# Patient Record
Sex: MALE | Race: WHITE | NOT HISPANIC OR LATINO | Employment: FULL TIME | URBAN - METROPOLITAN AREA
[De-identification: names, ages, dates, MRNs, and addresses within clinical notes are randomized per-mention and may not be internally consistent; named-entity substitution may affect disease eponyms.]

---

## 2023-02-18 ENCOUNTER — HOSPITAL ENCOUNTER (EMERGENCY)
Facility: HOSPITAL | Age: 38
Discharge: HOME | End: 2023-02-18
Payer: COMMERCIAL

## 2023-02-18 VITALS
WEIGHT: 200 LBS | HEIGHT: 69 IN | OXYGEN SATURATION: 99 % | TEMPERATURE: 98 F | DIASTOLIC BLOOD PRESSURE: 75 MMHG | RESPIRATION RATE: 20 BRPM | HEART RATE: 100 BPM | BODY MASS INDEX: 29.62 KG/M2 | SYSTOLIC BLOOD PRESSURE: 100 MMHG

## 2023-02-18 DIAGNOSIS — M54.9 BACK PAIN, UNSPECIFIED BACK LOCATION, UNSPECIFIED BACK PAIN LATERALITY, UNSPECIFIED CHRONICITY: ICD-10-CM
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
## 2023-02-18 DIAGNOSIS — T07.XXXA ABRASIONS OF MULTIPLE SITES: ICD-10-CM

## 2023-02-18 DIAGNOSIS — R07.81 RIB PAIN: ICD-10-CM

## 2023-02-18 PROCEDURE — 72125 CT NECK SPINE W/O DYE: CPT | Mod: TC

## 2023-02-18 PROCEDURE — 72128 CT THORACIC SPINE WITHOUT CONTRAST: ICD-10-PCS | Mod: 26,,, | Performed by: RADIOLOGY

## 2023-02-18 PROCEDURE — 72192 CT SACRUM WITHOUT CONTRAST: ICD-10-PCS | Mod: 26,,, | Performed by: RADIOLOGY

## 2023-02-18 PROCEDURE — 72128 CT CHEST SPINE W/O DYE: CPT | Mod: TC

## 2023-02-18 PROCEDURE — 72192 CT PELVIS W/O DYE: CPT | Mod: TC

## 2023-02-18 PROCEDURE — 72192 CT PELVIS W/O DYE: CPT | Mod: 26,,, | Performed by: RADIOLOGY

## 2023-02-18 PROCEDURE — 99284 EMERGENCY DEPT VISIT MOD MDM: CPT | Mod: 25

## 2023-02-18 PROCEDURE — 86803 HEPATITIS C AB TEST: CPT | Performed by: EMERGENCY MEDICINE

## 2023-02-18 PROCEDURE — 72131 CT LUMBAR SPINE W/O DYE: CPT | Mod: 26,,, | Performed by: RADIOLOGY

## 2023-02-18 PROCEDURE — 72131 CT LUMBAR SPINE W/O DYE: CPT | Mod: TC

## 2023-02-18 PROCEDURE — 72131 CT LUMBAR SPINE WITHOUT CONTRAST: ICD-10-PCS | Mod: 26,,, | Performed by: RADIOLOGY

## 2023-02-18 PROCEDURE — 72128 CT CHEST SPINE W/O DYE: CPT | Mod: 26,,, | Performed by: RADIOLOGY

## 2023-02-18 PROCEDURE — 36415 COLL VENOUS BLD VENIPUNCTURE: CPT | Performed by: EMERGENCY MEDICINE

## 2023-02-18 PROCEDURE — 72125 CT NECK SPINE W/O DYE: CPT | Mod: 26,,, | Performed by: RADIOLOGY

## 2023-02-18 PROCEDURE — 87389 HIV-1 AG W/HIV-1&-2 AB AG IA: CPT | Performed by: EMERGENCY MEDICINE

## 2023-02-18 PROCEDURE — 72125 CT CERVICAL SPINE WITHOUT CONTRAST: ICD-10-PCS | Mod: 26,,, | Performed by: RADIOLOGY

## 2023-02-18 NOTE — ED PROVIDER NOTES
Encounter Date: 2/18/2023       History     Chief Complaint   Patient presents with    Fall     Fell through attic approx 1 hour PTA. Denies LOC.    Back Pain     Low back pain    Rib Injury     L side rib pain    Abrasion     R side forehead     Patient is a 37-year-old male presents emergency room with lower back pain, left rib pain, multiple abrasions and scratches.  Patient was in an attic when he missed a step and fell through sheet rock falling approximately 8 ft.  MCFP down patient states he hit his left side on some stairway railing, and then to the floor.  Patient denies any loss of consciousness.  Patient does have abrasions to right temporal/cheek area, abrasion to the lumbar area lower back, and multiple scrapes to both legs, right worse than the left.  Patient was ambulatory prior to arrival.  Patient states it happened approximately 1 hour prior to arrival.  Patient denies taking any medications on daily basis, or prior to coming to the emergency room.  He denies any significant chest pain, shortness of breath, nausea, vomiting, diarrhea, dizziness, numbness, tingling, vision changes, abdominal pain.    Review of patient's allergies indicates:  No Known Allergies  History reviewed. No pertinent past medical history.  Past Surgical History:   Procedure Laterality Date    HERNIA REPAIR      TONSILLECTOMY       Family History   Problem Relation Age of Onset    Hyperlipidemia Father      Social History     Tobacco Use    Smoking status: Never    Smokeless tobacco: Never   Substance Use Topics    Alcohol use: Yes     Comment: 4-5 servings/week    Drug use: No     Review of Systems   Constitutional: Negative.    HENT: Negative.     Eyes: Negative.    Respiratory: Negative.     Cardiovascular: Negative.    Gastrointestinal: Negative.    Endocrine: Negative.    Genitourinary: Negative.    Musculoskeletal:         Mid to lower back pain   Skin:         Abrasions to right side of face, lower back, and  scratches to the legs.   Allergic/Immunologic: Negative for food allergies.   Neurological: Negative.    Hematological: Negative.    Psychiatric/Behavioral: Negative.     All other systems reviewed and are negative.    Physical Exam     Initial Vitals   BP Pulse Resp Temp SpO2   02/18/23 1153 02/18/23 1151 02/18/23 1151 02/18/23 1151 02/18/23 1151   100/75 100 20 97.6 °F (36.4 °C) 99 %      MAP       --                Physical Exam    Nursing note and vitals reviewed.  Constitutional: He appears well-developed and well-nourished. He is not diaphoretic. No distress.   HENT:   Head: Normocephalic.   Mouth/Throat: Oropharynx is clear and moist.   Eyes: Conjunctivae and EOM are normal. Pupils are equal, round, and reactive to light. No scleral icterus.   Neck: Neck supple. No tracheal deviation present.   Normal range of motion.  Cardiovascular:  Normal rate, regular rhythm, normal heart sounds and intact distal pulses.     Exam reveals no friction rub.       No murmur heard.  Pulmonary/Chest: Breath sounds normal. No respiratory distress. He has no wheezes. He has no rhonchi. He has no rales. He exhibits no tenderness.   Abdominal: Abdomen is soft. Bowel sounds are normal. He exhibits no distension.   Musculoskeletal:         General: No tenderness or edema. Normal range of motion.      Cervical back: Normal range of motion and neck supple.     Lymphadenopathy:     He has no cervical adenopathy.   Neurological: He is alert and oriented to person, place, and time. He has normal strength. He displays normal reflexes. No cranial nerve deficit or sensory deficit. GCS score is 15. GCS eye subscore is 4. GCS verbal subscore is 5. GCS motor subscore is 6.   Skin: Skin is warm and dry. Capillary refill takes 2 to 3 seconds.   Patient has small abrasions to the right temporal/forehead area/cheek.  Patient also has abrasions to the lumbar area, multiple scratches/abrasions to the right leg and few scratches to the left leg.    Psychiatric: He has a normal mood and affect.       ED Course   Procedures  Labs Reviewed   HIV 1 / 2 ANTIBODY   HEPATITIS C ANTIBODY          Imaging Results              CT Sacrum Without Contrast (Final result)  Result time 02/18/23 13:21:59      Final result by Ally Rivera MD (02/18/23 13:21:59)                   Impression:      No fracture or traumatic malalignment of the spine or sacrum.    Contusion/hematoma in the lower posterior subcutaneous fat.      Electronically signed by: Ally Rivera  Date:    02/18/2023  Time:    13:21               Narrative:    EXAMINATION:  CT CERVICAL SPINE WITHOUT CONTRAST; CT SACRUM WITHOUT CONTRAST; CT LUMBAR SPINE WITHOUT CONTRAST; CT THORACIC SPINE WITHOUT CONTRAST    CLINICAL HISTORY:  fall 8 ft with back pain;; Low back pain, increased fracture risk;; Mid-back pain, compression fracture suspected;    TECHNIQUE:  Low dose axial CT images through the cervical, thoracic, lumbar spine and sacrum with sagittal and coronal reformations.  Contrast was not administered.    COMPARISON:  None    FINDINGS:  Cervical spine:    Imaged brain is unremarkable.  The craniocervical junction is maintained.    The vertebral bodies are normal in height and morphology without evidence of fracture or osseous destructive process.  Normal sagittal alignment is preserved.    No significant spinal canal or neural foraminal stenosis.  Intervertebral disc heights are well maintained.    Limited evaluation of the intraspinal contents demonstrates no hematoma or mass.Paraspinal soft tissues exhibit no acute abnormalities.    Thoracic spine:    Motion to the midthoracic level obscures evaluation.  No definite significant vertebral body height loss or acute, displaced fracture.  Normal sagittal alignment is preserved.    New significant spinal canal or neural foraminal stenosis.  Intervertebral disc space heights are well maintained.    Limited evaluation of the intraspinal contents demonstrates no  hematoma or mass.Paraspinal soft tissues exhibit no acute abnormalities.    Lumbar spine:    The vertebral bodies are normal in height and morphology without evidence of fracture or osseous destructive process.  Normal sagittal alignment is preserved.    New significant spinal canal or neural foraminal stenosis. Intervertebral disc space heights are well maintained.    Limited evaluation of the intraspinal contents demonstrates no hematoma or mass    Intermediate attenuation fluid within the dependent posterior subcutaneous fat with adjacent stranding    Sacrum:    No acute abnormality within the pelvis.  Sacroiliac joints are symmetric.  The femoral heads are well seated within the acetabula.  Bone island within the right femoral neck.  No acute, displaced fracture or aggressive osseous abnormality.                                       CT Lumbar Spine Without Contrast (Final result)  Result time 02/18/23 13:21:59      Final result by Ally Rivera MD (02/18/23 13:21:59)                   Impression:      No fracture or traumatic malalignment of the spine or sacrum.    Contusion/hematoma in the lower posterior subcutaneous fat.      Electronically signed by: Ally Rivera  Date:    02/18/2023  Time:    13:21               Narrative:    EXAMINATION:  CT CERVICAL SPINE WITHOUT CONTRAST; CT SACRUM WITHOUT CONTRAST; CT LUMBAR SPINE WITHOUT CONTRAST; CT THORACIC SPINE WITHOUT CONTRAST    CLINICAL HISTORY:  fall 8 ft with back pain;; Low back pain, increased fracture risk;; Mid-back pain, compression fracture suspected;    TECHNIQUE:  Low dose axial CT images through the cervical, thoracic, lumbar spine and sacrum with sagittal and coronal reformations.  Contrast was not administered.    COMPARISON:  None    FINDINGS:  Cervical spine:    Imaged brain is unremarkable.  The craniocervical junction is maintained.    The vertebral bodies are normal in height and morphology without evidence of fracture or osseous destructive  process.  Normal sagittal alignment is preserved.    No significant spinal canal or neural foraminal stenosis.  Intervertebral disc heights are well maintained.    Limited evaluation of the intraspinal contents demonstrates no hematoma or mass.Paraspinal soft tissues exhibit no acute abnormalities.    Thoracic spine:    Motion to the midthoracic level obscures evaluation.  No definite significant vertebral body height loss or acute, displaced fracture.  Normal sagittal alignment is preserved.    New significant spinal canal or neural foraminal stenosis.  Intervertebral disc space heights are well maintained.    Limited evaluation of the intraspinal contents demonstrates no hematoma or mass.Paraspinal soft tissues exhibit no acute abnormalities.    Lumbar spine:    The vertebral bodies are normal in height and morphology without evidence of fracture or osseous destructive process.  Normal sagittal alignment is preserved.    New significant spinal canal or neural foraminal stenosis. Intervertebral disc space heights are well maintained.    Limited evaluation of the intraspinal contents demonstrates no hematoma or mass    Intermediate attenuation fluid within the dependent posterior subcutaneous fat with adjacent stranding    Sacrum:    No acute abnormality within the pelvis.  Sacroiliac joints are symmetric.  The femoral heads are well seated within the acetabula.  Bone island within the right femoral neck.  No acute, displaced fracture or aggressive osseous abnormality.                                       CT Thoracic Spine Without Contrast (Final result)  Result time 02/18/23 13:21:59      Final result by Ally Rivera MD (02/18/23 13:21:59)                   Impression:      No fracture or traumatic malalignment of the spine or sacrum.    Contusion/hematoma in the lower posterior subcutaneous fat.      Electronically signed by: Ally Rivera  Date:    02/18/2023  Time:    13:21               Narrative:     EXAMINATION:  CT CERVICAL SPINE WITHOUT CONTRAST; CT SACRUM WITHOUT CONTRAST; CT LUMBAR SPINE WITHOUT CONTRAST; CT THORACIC SPINE WITHOUT CONTRAST    CLINICAL HISTORY:  fall 8 ft with back pain;; Low back pain, increased fracture risk;; Mid-back pain, compression fracture suspected;    TECHNIQUE:  Low dose axial CT images through the cervical, thoracic, lumbar spine and sacrum with sagittal and coronal reformations.  Contrast was not administered.    COMPARISON:  None    FINDINGS:  Cervical spine:    Imaged brain is unremarkable.  The craniocervical junction is maintained.    The vertebral bodies are normal in height and morphology without evidence of fracture or osseous destructive process.  Normal sagittal alignment is preserved.    No significant spinal canal or neural foraminal stenosis.  Intervertebral disc heights are well maintained.    Limited evaluation of the intraspinal contents demonstrates no hematoma or mass.Paraspinal soft tissues exhibit no acute abnormalities.    Thoracic spine:    Motion to the midthoracic level obscures evaluation.  No definite significant vertebral body height loss or acute, displaced fracture.  Normal sagittal alignment is preserved.    New significant spinal canal or neural foraminal stenosis.  Intervertebral disc space heights are well maintained.    Limited evaluation of the intraspinal contents demonstrates no hematoma or mass.Paraspinal soft tissues exhibit no acute abnormalities.    Lumbar spine:    The vertebral bodies are normal in height and morphology without evidence of fracture or osseous destructive process.  Normal sagittal alignment is preserved.    New significant spinal canal or neural foraminal stenosis. Intervertebral disc space heights are well maintained.    Limited evaluation of the intraspinal contents demonstrates no hematoma or mass    Intermediate attenuation fluid within the dependent posterior subcutaneous fat with adjacent stranding    Sacrum:    No  acute abnormality within the pelvis.  Sacroiliac joints are symmetric.  The femoral heads are well seated within the acetabula.  Bone island within the right femoral neck.  No acute, displaced fracture or aggressive osseous abnormality.                                       CT Cervical Spine Without Contrast (Final result)  Result time 02/18/23 13:21:59      Final result by Ally Rivera MD (02/18/23 13:21:59)                   Impression:      No fracture or traumatic malalignment of the spine or sacrum.    Contusion/hematoma in the lower posterior subcutaneous fat.      Electronically signed by: Ally Rivera  Date:    02/18/2023  Time:    13:21               Narrative:    EXAMINATION:  CT CERVICAL SPINE WITHOUT CONTRAST; CT SACRUM WITHOUT CONTRAST; CT LUMBAR SPINE WITHOUT CONTRAST; CT THORACIC SPINE WITHOUT CONTRAST    CLINICAL HISTORY:  fall 8 ft with back pain;; Low back pain, increased fracture risk;; Mid-back pain, compression fracture suspected;    TECHNIQUE:  Low dose axial CT images through the cervical, thoracic, lumbar spine and sacrum with sagittal and coronal reformations.  Contrast was not administered.    COMPARISON:  None    FINDINGS:  Cervical spine:    Imaged brain is unremarkable.  The craniocervical junction is maintained.    The vertebral bodies are normal in height and morphology without evidence of fracture or osseous destructive process.  Normal sagittal alignment is preserved.    No significant spinal canal or neural foraminal stenosis.  Intervertebral disc heights are well maintained.    Limited evaluation of the intraspinal contents demonstrates no hematoma or mass.Paraspinal soft tissues exhibit no acute abnormalities.    Thoracic spine:    Motion to the midthoracic level obscures evaluation.  No definite significant vertebral body height loss or acute, displaced fracture.  Normal sagittal alignment is preserved.    New significant spinal canal or neural foraminal stenosis.   Intervertebral disc space heights are well maintained.    Limited evaluation of the intraspinal contents demonstrates no hematoma or mass.Paraspinal soft tissues exhibit no acute abnormalities.    Lumbar spine:    The vertebral bodies are normal in height and morphology without evidence of fracture or osseous destructive process.  Normal sagittal alignment is preserved.    New significant spinal canal or neural foraminal stenosis. Intervertebral disc space heights are well maintained.    Limited evaluation of the intraspinal contents demonstrates no hematoma or mass    Intermediate attenuation fluid within the dependent posterior subcutaneous fat with adjacent stranding    Sacrum:    No acute abnormality within the pelvis.  Sacroiliac joints are symmetric.  The femoral heads are well seated within the acetabula.  Bone island within the right femoral neck.  No acute, displaced fracture or aggressive osseous abnormality.                                    X-Rays:   Independently Interpreted Readings:   Other Readings:  CT of C-spine, thoracic, lumbar, sacral     There is no acute fractures identified.  Radiology report reviewed, I agree.  See Radiology impressions below.      Impression:     No fracture or traumatic malalignment of the spine or sacrum.     Contusion/hematoma in the lower posterior subcutaneous fat.        Electronically signed by: Ally Rivera  Date:                                            02/18/2023  Medications - No data to display  Medical Decision Making:   Initial Assessment:   Patient seen examined emergency room, no acute distress at this time.  Detailed assessment as noted above.  Differential Diagnosis:   Fractures, dislocations, compression fractures, contusions, abrasions, lacerations, concussion  Clinical Tests:   Radiological Study: Ordered  ED Management:  After patient was seen examined, CT C-spine, thoracic, lumbar, sacrum was ordered.                        Clinical Impression:    Final diagnoses:  [W19.XXXA] Fall, initial encounter (Primary)  [T07.XXXA] Abrasions of multiple sites  [M54.9] Back pain, unspecified back location, unspecified back pain laterality, unspecified chronicity  [R07.81] Rib pain        ED Disposition Condition    Discharge Stable          ED Prescriptions    None       Follow-up Information    None          Piotr Knight NP  02/18/23 [id number]

## 2023-02-18 NOTE — DISCHARGE INSTRUCTIONS
Take Tylenol ibuprofen as needed for pain.    Follow-up primary care provider in 3-5 days if you experience significant pains.    You may have some muscle tenderness as well as some body aches secondary to the fall.    Warm moist heat may help with muscle pains.    Be sure to clean all scrapes and abrasions antibacterial soap and water, you may use triple antibiotic ointment if you desire.    Return emergency room if develops any new other worrisome symptoms.

## 2023-02-19 LAB
HCV AB SERPL QL IA: NORMAL
HIV 1+2 AB+HIV1 P24 AG SERPL QL IA: NORMAL